# Patient Record
(demographics unavailable — no encounter records)

---

## 2024-09-09 NOTE — ER
Nurse's Notes                                                                                     

 [facility]                                                                 

Name: Marisabel Cortés                                                                               

Age: 30 yrs                                                                                       

Sex: Female                                                                                       

: 1994                                                                                   

MRN: [id number]                                                                                   

Arrival Date: 2024                                                                          

Time: 17:56                                                                                       

Account#: [id number]                                                                            

Bed 13                                                                                            

Private MD:                                                                                       

Diagnosis: Syncope Near                                                                           

                                                                                                  

Presentation:                                                                                     

                                                                                             

18:14 Chief complaint: Patient states: Seen at [facility] today due to patient passing out cm10

      in vehicle. Pt's  states that patient has been more weak. Pt's  reports       

      that she brought patient to this ER due to wanting more tests done. Pt complaining of       

      generalized weakness. Coronavirus screen: Client denies travel out of the [place] in the       

      last 14 days. Ebola Screen: Patient denies travel to an Ebola-affected area in the 21       

      days before illness onset. No symptoms or risks identified at this time. Initial Sepsis     

      Screen: Does the patient meet any 2 criteria? No. Patient's initial sepsis screen is        

      negative. Does the patient have a suspected source of infection? No. Patient's initial      

      sepsis screen is negative. Risk Assessment: Do you want to hurt yourself or someone         

      else? Patient reports no desire to harm self or others. Onset of symptoms was 2024.                                                                                   

18:14 Method Of Arrival: Ambulatory                                                           cm10

18:14 Acuity: HALIE 3                                                                           cm10

                                                                                                  

Triage Assessment:                                                                                

18:17 General: Appears in no apparent distress. comfortable, Behavior is calm, cooperative.   cm10

      Neuro: No deficits noted. Level of Consciousness is awake, alert, obeys commands,           

      Oriented to person, place, time, situation, Appropriate for age. Respiratory: No            

      deficits noted. Airway is patent Respiratory effort is even, unlabored, Respiratory         

      pattern is regular, symmetrical.                                                            

                                                                                                  

OB/GYN:                                                                                           

23:02 Not pregnant                                                                            al5 

                                                                                                  

Historical:                                                                                       

- Allergies:                                                                                      

18:17 PENICILLINS;                                                                            cm10

18:17 tramadol;                                                                               cm10

18:17 Mushrooms;                                                                              cm10

- PMHx:                                                                                           

18:17 Asthma; Rheumatoid Arthritis; SVT;                                                      cm10

                                                                                                  

- Immunization history:: Adult Immunizations up to date.                                          

- Infectious Disease History:: Denies.                                                            

- Social history:: Smoking status: Patient denies any tobacco usage or history of.                

                                                                                                  

                                                                                                  

Screenin:24 [facility] ED Fall Risk Assessment (Adult) History of falling in the last 3 months,       al5 

      including since admission No falls in past 3 months (0 pts) Confusion or Disorientation     

      No (0 pts) Intoxicated or Sedated No (0 pts) Impaired Gait No (0 pts) Mobility Assist       

      Device Used No (0 pt) Altered Elimination No (0 pt) Score/Fall Risk Level 0 - 2 = Low       

      Risk Oriented to surroundings, Maintained a safe environment, Hourly rounding (assess       

      needs \T\ fall precautionary measures) done. Abuse screen: Denies threats or abuse.         

      Denies injuries from another. Nutritional screening: No deficits noted. Tuberculosis        

      screening: No symptoms or risk factors identified.                                          

                                                                                                  

Assessment:                                                                                       

21:26 General: Appears in no apparent distress. Behavior is calm, cooperative. General:       al5 

      Appears tired. Pain: Denies pain. Neuro: Level of Consciousness is awake, alert, obeys      

      commands, lethargic, Oriented to person, place, time, situation. Cardiovascular:            

      Capillary refill < 3 seconds Patient's skin is warm and dry. Respiratory: Airway is         

      patent Respiratory effort is even, unlabored, Respiratory pattern is regular,               

      symmetrical. GI: No signs and/or symptoms were reported involving the gastrointestinal      

      system. : No signs and/or symptoms were reported regarding the genitourinary system.      

      EENT: No signs and/or symptoms were reported regarding the EENT system. Derm: Skin is       

      intact, Skin is pink, warm \T\ dry. normal. Musculoskeletal: No signs and/or symptoms       

      reported regarding the musculoskeletal system.                                              

                                                                                                  

Vital Signs:                                                                                      

18:14  / 92; Pulse 83; Resp 18; Temp 96.9(IR); Pulse Ox 100% on R/A; Weight 83.46 kg;   cm10

      Height 5 ft. 5 in. ; Pain 0/10;                                                             

21:30  / 91; Pulse 59; Resp 16; Pulse Ox 100% on R/A;                                   al5 

21:45  / 86; Pulse 54; Resp 16; Pulse Ox 99% on R/A;                                    al5 

22:00  / 85; Pulse 57; Resp 16; Pulse Ox 99% on R/A;                                    al5 

22:15  / 90; Pulse 59; Resp 17; Pulse Ox 99% on R/A;                                    al5 

22:30  / 98; Pulse 63; Resp 18; Pulse Ox 100% on R/A;                                   al5 

22:47  / 98; Pulse 62; Resp 18; Pulse Ox 100% on R/A;                                   al5 

18:14 Body Mass Index 30.62 (83.46 kg, 165.1 cm)                                              cm10

18:14 Pain Scale: Adult                                                                       cm10

                                                                                                  

ED Course:                                                                                        

18:00 Patient arrived in ED.                                                                  jj6 

18:03 Den Montes De Oca PA is PHCP.                                                                cp  

18:03 Louis Lin MD is Attending Physician.                                                cp  

18:04 PHCP role handed off by Den Montes De Oca PA                                                 sb4 

18:04 Ayana Zee PA-C is PHCP.                                                            sb4 

18:17 Triage completed.                                                                       cm10

18:18 Arm band placed on Patient placed in waiting room.                                      cm10

19:47 CT Head Brain wo Cont In Process Unspecified.                                           EDMS

20:39 Eulalia Bradford, RN is Primary Nurse.                                                 al5 

21:17 EKG done, by ED staff.                                                                  vk  

21:24 Basic Metabolic Panel Sent.                                                             al5 

21:24 CBC with Diff Sent.                                                                     al5 

21:24 Hepatic Function Sent.                                                                  al5 

21:24 Magnesium Sent.                                                                         al5 

21:24 Pregnancy Test, Urine Sent.                                                             al5 

21:24 Protime (+inr) Sent.                                                                    al5 

21:24 Ptt, Activated Sent.                                                                    al5 

21:24 Troponin High Sensitivity Sent.                                                         al5 

21:24 UDS Sent.                                                                               al5 

21:24 Urinalysis w/ reflexes Sent.                                                            al5 

21:25 Patient has correct armband on for positive identification. Bed in low position. Call   al5 

      light in reach. Side rails up X 1. Provided Education on: processes and procedures.         

21:25 No provider procedures requiring assistance completed. Inserted saline lock: 22 gauge   al5 

      in right antecubital area, using aseptic technique.                                         

22:43 Avinash Diego MD is Referral Physician.                                             sb4 

23:03 IV discontinued, intact, bleeding controlled, No redness/swelling at site. Pressure     al5 

      dressing applied.                                                                           

                                                                                                  

Administered Medications:                                                                         

No medications were administered                                                                  

                                                                                                  

                                                                                                  

Medication:                                                                                       

21:25 VIS not applicable for this client.                                                     al5 

                                                                                                  

Outcome:                                                                                          

22:43 Discharge ordered by MD.                                                                sb4 

23:03 Discharged to home ambulatory, with significant other,                                  al5 

23:03 Condition: good                                                                             

23:03 Discharge instructions given to patient, Instructed on discharge instructions, follow       

      up and referral plans. Demonstrated understanding of instructions, follow-up care,          

23:03 Patient left the ED.                                                                    al5 

                                                                                                  

Signatures:                                                                                       

Dispatcher [employer]                           EDMS                                                 

Den Montes De Oca PA                         PA   cp                                                   

Gentry, Quin                           jj6                                                  

Ayana Zee PA-C                     PATIM sb4                                                  

Erica Marina, RN                  RN   cm10                                                 

Chyna Schaeffer Amanda, RN                   RN   al5                                                  

                                                                                                  

Corrections: (The following items were deleted from the chart)                                    

18:17 18:14 Chief complaint: Patient states: Seen at [facility] today due to patient       cm10

      passing out in vehicle. Pt's  states that patient has been more weak. Pt's           

       reports that she brought patient to this ER due to wanting more tests done. cm10    

                                                                                                  

**************************************************************************************************

## 2024-09-09 NOTE — RAD REPORT
EXAM DESCRIPTION:  CT - Head Brain Wo Cont - 9/9/2024 7:47 pm

 

CLINICAL HISTORY:  Syncope

 

COMPARISON:  none

 

TECHNIQUE:  Computed axial tomography of the head was obtained. IV contrast was not requested.

 

All CT scans are performed using dose optimization technique as appropriate and may include automated
 exposure control or mA/KV adjustment according to patient size.

 

FINDINGS:  An intracranial  bleed is not seen

 

The ventricles are normal in caliber

 

No significant hypodense areas within the brain visualized

 

No extra-axial fluid collection is noted.

 

Fluid within the sinuses/ mastoids is not seen

 

IMPRESSION:  No acute intracranial abnormality is seen

 

If patient's symptoms persist  MRI of the brain would be recommended

## 2024-09-09 NOTE — EDPHYS
Physician Documentation                                                                           

 [facility]                                                                 

Name: Marisabel Cortés                                                                               

Age: 30 yrs                                                                                       

Sex: Female                                                                                       

: 1994                                                                                   

MRN: [id number]                                                                                   

Arrival Date: 2024                                                                          

Time: 17:56                                                                                       

Account#: [id number]                                                                            

Bed 13                                                                                            

Private MD:                                                                                       

ED Physician Louis Lin                                                                         

HPI:                                                                                              

                                                                                             

18:26 This 30 yrs old  Female presents to ER via Ambulatory with complaints of        sb4 

      Weakness.                                                                                   

18:26 Patient states that this morning she started feeling strangely-weak, moving slowly,     sb4 

      brain fog. She called her  who came home and called an ambulance.  states     

      that she was going in and out of consciousness and shaking but states that he did not       

      think it was seizure-like activity. States that she is kind of been in a "daze" since       

      this happened. She was evaluated at [facility], had basic blood work done and was        

      discharged.  does not think she is normal and brought her here for second            

      opinion.                                                                                    

                                                                                                  

OB/GYN:                                                                                           

23:02 Not pregnant                                                                            al5 

                                                                                                  

Historical:                                                                                       

- Allergies:                                                                                      

18:17 PENICILLINS;                                                                            cm10

18:17 tramadol;                                                                               cm10

18:17 Mushrooms;                                                                              cm10

- PMHx:                                                                                           

18:17 Asthma; Rheumatoid Arthritis; SVT;                                                      cm10

                                                                                                  

- Immunization history:: Adult Immunizations up to date.                                          

- Infectious Disease History:: Denies.                                                            

- Social history:: Smoking status: Patient denies any tobacco usage or history of.                

                                                                                                  

                                                                                                  

ROS:                                                                                              

18:26 Constitutional: Negative for fever, chills, and weight loss,                            sb4 

18:26 Neuro: Positive for dizziness, loss of consciousness, syncope, tingling, weakness,          

18:26 All other systems are negative,                                                             

                                                                                                  

Exam:                                                                                             

18:26 Head/Face:  Normocephalic, atraumatic. Eyes:  Extra-ocular motions intact.  Periorbital sb4 

      areas with no swelling, redness, or edema. ENT:  Mucous membranes moist.                    

      Cardiovascular:  Regular rate and rhythm with a normal S1 and S2. Respiratory:  Lungs       

      have equal breath sounds bilaterally, clear to auscultation and percussion.  No rales,      

      rhonchi or wheezes noted.  No increased work of breathing, no retractions or nasal          

      flaring. Abdomen/GI:  Soft, non-tender, no distension. Skin:  Warm, dry with normal         

      turgor.  Normal color with no rashes, no lesions, and no evidence of cellulitis. MS/        

      Extremity:  Pulses equal, no cyanosis.  Neurovascular intact.  Full, normal range of        

      motion.                                                                                     

18:26 Constitutional: The patient appears in no acute distress, alert, awake,                     

18:26 Neuro: Orientation: to person, place, time \T\ situation. Mentation: is normal, lucid,      

      able to follow commands, sleepy, Memory: is normal, Motor: moves all fours, Sensation:      

      is normal, Gait: is steady, seizure activity, is not displayed by the patient,              

18:26 Psych: Affect is flat,                                                                      

                                                                                                  

Vital Signs:                                                                                      

18:14  / 92; Pulse 83; Resp 18; Temp 96.9(IR); Pulse Ox 100% on R/A; Weight 83.46 kg;   cm10

      Height 5 ft. 5 in. ; Pain 0/10;                                                             

21:30  / 91; Pulse 59; Resp 16; Pulse Ox 100% on R/A;                                   al5 

21:45  / 86; Pulse 54; Resp 16; Pulse Ox 99% on R/A;                                    al5 

22:00  / 85; Pulse 57; Resp 16; Pulse Ox 99% on R/A;                                    al5 

22:15  / 90; Pulse 59; Resp 17; Pulse Ox 99% on R/A;                                    al5 

22:30  / 98; Pulse 63; Resp 18; Pulse Ox 100% on R/A;                                   al5 

22:47  / 98; Pulse 62; Resp 18; Pulse Ox 100% on R/A;                                   al5 

18:14 Body Mass Index 30.62 (83.46 kg, 165.1 cm)                                              cm10

18:14 Pain Scale: Adult                                                                       cm10

                                                                                                  

MDM:                                                                                              

18:05 Patient medically screened.                                                             sb4 

22:42 Data reviewed: vital signs, nurses notes, lab test result(s), EKG, radiologic studies,  sb4 

      and as a result, I will discharge patient. Counseling: I had a detailed discussion with     

      the patient and/or guardian regarding the historical points, exam findings, and any         

      diagnostic results supporting the discharge/admit diagnosis, lab results, radiology         

      results, the need for outpatient follow up, a neurologist, to return to the emergency       

      department if symptoms worsen or persist or if there are any questions or concerns that     

      arise at home.                                                                              

                                                                                                  

                                                                                             

18:25 Order name: Basic Metabolic Panel; Complete Time: 22:04                                 sb4 

                                                                                             

18:25 Order name: CBC with Diff; Complete Time: 21:38                                         sb4 

                                                                                             

18:25 Order name: Hepatic Function; Complete Time: 22:04                                      sb4 

                                                                                             

18:25 Order name: Magnesium; Complete Time: 22:04                                             sb4 

                                                                                             

18:25 Order name: Pregnancy Test, Urine; Complete Time: 21:38                                 sb4 

                                                                                             

18:25 Order name: Protime (+inr); Complete Time: 21:43                                        sb4 

                                                                                             

18:25 Order name: Ptt, Activated; Complete Time: 21:43                                        sb4 

                                                                                             

18:25 Order name: Troponin High Sensitivity; Complete Time: 22:04                             sb4 

                                                                                             

18:25 Order name: UDS; Complete Time: :43                                                   sb4 

                                                                                             

18:25 Order name: Urinalysis w/ reflexes; Complete Time: 21:38                                sb4 

                                                                                             

18:25 Order name: CT Head Brain wo Cont; Complete Time: 20:08                                 sb4 

                                                                                             

18:25 Order name: EKG; Complete Time: 18:26                                                   sb4 

                                                                                             

18:25 Order name: Cardiac monitoring; Complete Time: 21:24                                    sb4 

                                                                                             

18:25 Order name: EKG - Nurse/Tech; Complete Time: 21:08                                      sb4 

                                                                                             

18:25 Order name: IV Saline Lock; Complete Time: 21:24                                        sb4 

                                                                                             

18:25 Order name: Labs collected and sent; Complete Time: 21:24                               sb4 

                                                                                             

18:25 Order name: O2 Per Protocol; Complete Time: 21:23                                       sb4 

                                                                                             

18:25 Order name: O2 Sat Monitoring; Complete Time: 21:23                                     sb4 

                                                                                                  

EC:31 Rate is 68 beats/min. Rhythm is regular, Normal Sinus Rhythm. TN interval is normal at  sb4 

      200 msec. QRS interval is normal at 98 msec. QT interval is normal at 424 msec. No Q        

      waves. T waves are Normal. No ST changes noted. Clinical impression: Normal ECG and No      

      evidence of ischemia. Interpreted by me. Reviewed by me.                                    

                                                                                                  

Administered Medications:                                                                         

No medications were administered                                                                  

                                                                                                  

                                                                                                  

Disposition Summary:                                                                              

24 22:43                                                                                    

Discharge Ordered                                                                                 

 Notes:       Location: Home                                                                        
  sb4

      Problem: new                                                                            sb4 

      Symptoms: have improved                                                                 sb4 

      Condition: Stable                                                                       sb4 

      Diagnosis                                                                                   

        - Syncope Near                                                                        sb4 

      Followup:                                                                               sb4 

        - With: Avinash Diego MD                                                               

        - When: As needed                                                                          

        - Reason: Further diagnostic work-up, Recheck today's complaints, Re-evaluation by         

      your physician                                                                              

      Discharge Instructions:                                                                     

        - Discharge Summary Sheet                                                             sb4 

        - Near-Syncope                                                                        sb4 

        - Non-Epileptic Seizures, Adult                                                       sb4 

      Forms:                                                                                      

        - Patient Portal Instructions                                                         sb4 

        - Leadership Thank You Letter                                                         sb4 

Signatures:                                                                                       

Dispatcher [employer]                           Ayana Butler PA-C PA-C sb4                                                  

Erica Marina RN                  RN   cm10                                                 

                                                                                                  

**************************************************************************************************

## 2024-09-09 NOTE — XMS REPORT
Continuity of Care Document



                          Created on: 2024





DHEERAJ NICHOLAS

External Reference #: [id number]

: 1994

Sex: Female



Demographics





                                        Address             [address] [place] [address]  [id number]

 

                                        Home Phone          [phone]

 

                                        Work Phone          [phone]

 

                                        Mobile Phone        [phone]

 

                                        Email Address       [email or web address]

[email or web address]

 

                                        Preferred Language  Unknown

 

                                        Marital Status      Unknown

 

                                        Amish Affiliation Unknown

 

                                        Race                Unknown

 

                                        Additional Race(s)  White

 

                                        Ethnic Group        Unknown





Author





                                        Name                Unknown

 

                                        Address             [address] [facility]

[place] [id number]

 

                                        [facility]

[facility]

 

                                        Address             [address] [facility] [address]

[address]

[place]  [address]

 

                                        Phone               [phone]





Care Team Providers





                                Care Team Member Name Role            Phone

 

                                Tiffanie Lehman   Primary Care Physician [phone]







Allergies, Adverse Reactions, Alerts





                                                    Allergy 

Name                                    Allergy 

Type            Status          Severity        Reaction(s)     Onset 

Date                                    Inactive 

Date                                    Treating 

Clinician                 Comments                  Source

 

                                                    Penicill

ins                                     Propensi

ty to 

adverse 

reaction 

to drug         Active                                          

6-19 

00:00:

00                                                              Lamberto Tovar

 

                                                    Tramadol 

- CLASS                                 Propensi

ty to 

adverse 

reaction 

to drug         Active                                          

8-11 

00:00:

00                                                              Lamberto Tovar







Medications





                                                    Ordered 

Medication 

Name                                    Filled 

Medication 

Name                                    Start 

Date                                    Stop 

Date                                    Current 

Medication?                             Ordering 

Clinician       Indication      Dosage          Frequency       Signature 

(SIG)               Comments            Components          Source

 

                                                    TAKE 1 

TABLET BY 

MOUTH EVERY 

DAY                                                 

3-04 

00:00:

00            Yes                  10                                 Lamberto Tovar

 

                                                    TAKE 1 

TABLET BY 

MOUTH EVERY 

6 HOURS 

WITH FOOD 

OR MILK AS 

NEEDED FOR 

PAIN                                                

2-27 

00:00:

00            Yes                                                     Lamberto Tovar

 

                                                    FLUCONAZOLE 

200 MG TABS                                         

2-02 

00:00:

00            Yes                                                     Lamberto Tovar

 

                                                    INSERT 1 

APPLICATOR 

INTO VAGINA 

AT BEDTIME 

FOR 7 DAYS                                          

2-01 

00:00:

00            Yes                                                     Lamberto Tovar

 

                                                    TAKE 1 

TABLET BY 

MOUTH EVERY 

6 HOURS AS 

NEEDED FOR 

ITCHING                                             

1-31 

00:00:

00            Yes                                                     Lamberto Tovar

 

                                                    INSTILL 1 

DROP INTO 

LEFT EYE 4 

TIMES 

DAILY.                                              

1-19 

00:00:

00                                      -14 

00:00

:00     No                      1                                       Lamberto Tovar

 

                                                    BUSPIRONE 

HYDROCHLORI

DE 10 MG 

TABS                                                

1-09 

00:00:

00            Yes                                                     Lamberto Tovar

 

                                                    TAKE 2 

TABLETS BY 

MOUTH EVERY 

DAY AS 

NEEDED FOR 

CONSTIPATIO

N                                                   2023

2-15 

00:00:

00            Yes                                                     Lamberto Tovar

 

                                                    TAKE 1 

TABLET BY 

MOUTH IN 

THE MORNING 

AND IN THE 

EVENING                                             202315 

00:00:

00            Yes                                                     Lamberto Tovar

 

                                                    METOPROLOL 

TARTRATE 25 

MG TABS                                             2023

2-13 

00:00:

00            Yes                                                     Lamberto Tovar

 

                                                    BUSPIRONE 

HYDROCHLORI

DE 7.5 MG 

TABS                                                2023

1-17 

00:00:

00            Yes                                                     Lamberto Tovar

 

                                                    TAKE 1 

CAPSULE 

TWICE 

DAILY.                                              2023-15 

00:00:

00                                       

00:00

:00     No                      75                                      Lamberto Tovar

 

                                                    TAKE 1 

CAPSULE BY 

MOUTH IN 

THE MORNING 

AND 1 

CAPSULE IN 

THE EVENING 

FOR 5 DAYS                                          2023 

00:00:

00            Yes                                                     Lamberto Tovar

 

                                                    VENTOLIN 

 (90 

Base) 

MCG/ACT 

AERS                                                2023 

00:00:

00            Yes                                                     Lamberto Tovar

 

                                                    HYDROXYCHLO

ROQUINE 

SULFATE 200 

MG TABS                                             - 

00:00:

00            Yes                                                     Lamberto Tovar

 

                                                    INSERT 1 

APPLICATORF

UL 

INTRAVAGINA

LLY AT 

BEDTIME 

NIGHTLY.                                            -15 

00:00:

00                                       

00:00

:00     No                      2                                       Lamberto Tovar

 

                                                    TAKE 1 

TABLET NOW 

AND 1 IN 3 

DAYS                                                -15 

00:00:

00                                       

00:00

:00     No                      150                                     aLmberto Tovar

 

                                                    TAKE 1 

TABLET BY 

MOUTH EVERY 

DAY                                                 - 

00:00:

00            Yes                                                     Lamberto Tovar

 

                                                    PREDNISONE 

10 MG TABS                                          

5-04 

00:00:

00            Yes                                                     Lamberto Tovar

 

                                                    ENBREL 

SURECLICK 

50 MG/ML 

SOAJ                                                

2-27 

00:00:

00            Yes                                                     Lamberto Tovar

 

                                                    TAKE 1 

TABLET 

TWICE DAILY 

WITH FOOD.                                          

2-20 

00:00:

00                                       

00:00

:00     No                      [id number]                                  Lamberto Tovar

 

                                                    TAKE 1 

TABLET 

DAILY.                                              

2-16 

00:00:

00                                       

00:00

:00     No                      10                                      Lamberto Tovar

 

                                                    INJECT 1.2 

MG 

SUBCUTANEOU

SLY EVERY 

DAY                                                 

2-10 

00:00:

00                                       

00:00

:00     No                      183                                     Lamberto Tovar

 

                                                    INJECT 

0.6MG 

SUBCUTANEOU

SLY DAILY                                           

1-13 

00:00:

00                                       

00:00

:00     No                      183                                     Lamberto Tovar

 

                                                    TAKE [id number] 

IU WEEKLY 

WITH A MEAL 

FOR 12 

WEEKS                                               2022

2-08 

00:00:

00                                       

00:00

:00             No                                              [id number]

[id number]                                                                Lamberto Tovar

 

                                                    APPLY 

SPARINGLY 

TO THE 

AFFECTED 

AREA(S) 

TWICE 

DAILY.                                              2022

2 

00:00:

00                                       

00:00

:00     No                      3                                       Lamberto Tovar

 

                                                    VENTOLIN 

 (90 

Base) 

MCG/ACT 

AERS                                                18 

00:00:

00            Yes                                                     Lamberto Tovar

 

                                                    TAKE 1 

CAPSULE BY 

MOUTH TWICE 

DAILY                                                

00:00:

00            Yes                                                     Lamberto Tovar

 

                                                    TAKE 6 

TABLETS BY 

MOUTH EVERY 

WEEK                                                 

00:00:

00            Yes                                                     Lamberto Tovar

 

                                                    PREDNISONE 

2.5 MG TABS                                          

00:00:

00            Yes                                                     Lamberto Tovar

 

                                                    IBUPROFEN 

800 MG TABS                                         

8- 

00:00:

00            Yes                                                     Lamberto Tovar

 

                                                    NORETHINDRO

NE 0.35 MG 

TABS                                                 

00:00:

00            Yes                                                     Lamberto Tovar

 

                                                    TAKE 1 

TABLET BY 

MOUTH DAILY                                         - 

00:00:

00            Yes                                                     Lamberto Tovar

 

                                                    VENTOLIN 

 (90 

Base) 

MCG/ACT 

AERS                                                2022-0

3- 

00:00:

00            Yes                                                     Lamberto Tovar

 

                                                    METOPROLOL 

SUCCINATE 

ER 25 MG 

TB24                                                

3-22 

00:00:

00            Yes                                                     Lamberto Tovar

 

                                                    OSELTAMIVIR 

PHOSPHATE 

75 MG                                               2022-0

3- 

00:00:

00            Yes                                                     Lamberto Tovar







Immunizations





                                                    Ordered Immunization 

Name                                    Filled Immunization 

Name            Date            Status          Comments        Source

 

                                                    (Old, dont use) 

Moderna Covid-19 

Vaccine (Aged 12 

years and older) 

()                               (Old, dont use) 

Moderna Covid-19 

Vaccine (Aged 12 

years and older) 

()                               2022 

00:00:00            Completed                               Lamberto Tovar

 

                                                    (Old, dont use) 

Moderna Covid-19 

Vaccine (Aged 12 

years and older) 

()                               (Old, dont use) 

Moderna Covid-19 

Vaccine (Aged 12 

years and older) 

()                               2022 

00:00:00            Completed                               Lamberto Tovar

 

                                                    Td(adult) 

unspecified fo                          Td(adult) 

unspecified fo                          2001 

00:00:00            Completed                               Lamberto Tovar

 

                                varicella       varicella       2000 

00:00:00            Completed                               Lamberto Tovar

 

                                MMR             MMR             1998 

00:00:00            Completed                               Lamberto Tovar

 

                                                    polio, unspecified 

formu                                   polio, unspecified 

formu                                   1998 

00:00:00            Completed                               Lamberto Tovar

 

                                                    DTaP, unspecified 

formul                                  DTaP, unspecified 

formul                                  1998 

00:00:00            Completed                               Lamberto Tovar

 

                                                    Hib, unspecified 

formula                                 Hib, unspecified 

formula                                 1995 

00:00:00            Completed                               Lamberto Tovar

 

                                                    DTaP, unspecified 

formul                                  DTaP, unspecified 

formul                                  1995 

00:00:00            Ubaldo Tovar

 

                                MMR             MMR             1995 

00:00:00            Completed                               Lamberto Tovar

 

                                                    Hep B, unspecified 

formu                                   Hep B, unspecified 

formu                                   1994 

00:00:00            Completed                               Lamberto Tovar

 

                                                    Hib, unspecified 

formula                                 Hib, unspecified 

formula                                 1994 

00:00:00            Completed                               Lamberto Tovar

 

                                                    polio, unspecified 

formu                                   polio, unspecified 

formu                                   1994 

00:00:00            Ubaldo Tovar

 

                                                    DTaP, unspecified 

formul                                  DTaP, unspecified 

formul                                  1994 

00:00:00            Ubaldo Tovar

 

                                                    Hib, unspecified 

formula                                 Hib, unspecified 

formula                                 1994 

00:00:00            Completed                               Lamberto Tovar

 

                                                    polio, unspecified 

formu                                   polio, unspecified 

formu                                   1994 

00:00:00            Completed                               Lamberto Tovar

 

                                                    DTaP, unspecified 

formul                                  DTaP, unspecified 

formul                                  1994 

00:00:00            Completed                               Lamberto Tovar

 

                                                    Hep B, unspecified 

formu                                   Hep B, unspecified 

formu                                   1994 

00:00:00            Completed                               Lamberto Tovar

 

                                                    Hib, unspecified 

formula                                 Hib, unspecified 

formula                                 1994 

00:00:00            Completed                               Lamberto Tovar

 

                                                    polio, unspecified 

formu                                   polio, unspecified 

formu                                   1994 

00:00:00            Completed                               Lamberto Tovar

 

                                                    DTaP, unspecified 

formul                                  DTaP, unspecified 

formul                                  1994 

00:00:00            Completed                               Lamberto Tovar

 

                                                    Hep B, unspecified 

formu                                   Hep B, unspecified 

formu                                   1994 

00:00:00            Completed                               Lamberto Tovar







Vital Signs





                      Vital Name Observation Time Observation Value Comments   S

chapito

 

                      BP Systolic 2023 09:12:00 103 mm[Hg]            Step

hen F Guy

 

                      BP Diastolic 2023 09:12:00 68 mm[Hg]             Pepito

phen F Guy

 

                      Weight Measured 2023 09:12:00 196.00 pounds         

   Lamberto F Guy

 

                      Height Measured 2023 09:12:00 63.78 inches          

  Lamberto F Guy

 

                      Body Temperature 2023 09:12:00 98.30 degrees        

    Lamberto F Guy

 

                      Heart Rate 2023 09:12:00 88.00 /min            Hilary

en F Guy

 

                      Respiratory Rate 2023 09:12:00                      

 Lamberto F Guy

 

                      BP Systolic 2023 08:47:00 103 mm[Hg]            Step

hen F Guy

 

                      BP Diastolic 2023 08:47:00 68 mm[Hg]             Pepito

phen F Guy

 

                      Weight Measured 2023 08:47:00 196.00 pounds         

   Lamberto F Guy

 

                      Height Measured 2023 08:47:00 63.78 inches          

  Labmerto F Guy

 

                      Body Temperature 2023 08:47:00 98.30 degrees        

    Lamberto F Guy

 

                      Heart Rate 2023 08:47:00 88.00 /min            Hilary

en F Guy

 

                      Respiratory Rate 2023 08:47:00                      

 Lamberto F Guy

 

                      BP Systolic 2023-10-12 16:39:00 111 mm[Hg]            Step

hen F Guy

 

                      BP Diastolic 2023-10-12 16:39:00 71 mm[Hg]             Pepito

phen F Guy

 

                      Weight Measured 2023-10-12 16:39:00 190.00 pounds         

   Lamberto F Guy

 

                      Height Measured 2023-10-12 16:39:00 63.78 inches          

  Lamberto F Guy

 

                      Body Temperature 2023-10-12 16:39:00 98.20 degrees        

    Lamberto F Guy

 

                      Heart Rate 2023-10-12 16:39:00 91.00 /min            Hilary

en F Guy

 

                      Respiratory Rate 2023-10-12 16:39:00                      

 Lamberto F Guy

 

                      BP Systolic 2023 16:22:00 125 mm[Hg]            Step

hen F Guy

 

                      BP Diastolic 2023 16:22:00 75 mm[Hg]             Pepito

phen F Guy

 

                      Weight Measured 2023 16:22:00 188.00 pounds         

   Lamberto F Guy

 

                      Height Measured 2023 16:22:00 63.78 inches          

  Lamberto F Guy

 

                      Body Temperature 2023 16:22:00 97.60 degrees        

    Lamberto F Guy

 

                      Heart Rate 2023 16:22:00 65.00 /min            Hilary

en F Guy

 

                      Respiratory Rate 2023 16:22:00                      

 Lamberto F Guy

 

                      BP Systolic 2023 17:44:00 124 mm[Hg]            Step

hen F Guy

 

                      BP Diastolic 2023 17:44:00 79 mm[Hg]             Pepito

phen F Guy

 

                      Weight Measured 2023 17:44:00 185.20 pounds         

   Lamberto F Guy

 

                      Height Measured 2023 17:44:00 63.78 inches          

  Lamberto F Guy

 

                      Body Temperature 2023 17:44:00 98.30 degrees        

    Lamberto F Guy

 

                      Heart Rate 2023 17:44:00 64.00 /min            Hilary

en F Guy

 

                      Respiratory Rate 2023 17:44:00                      

 Lamberto F Guy

 

                      BP Systolic 2023-06-15 17:59:00 118 mm[Hg]            Step

hen F Guy

 

                      BP Diastolic 2023-06-15 17:59:00 76 mm[Hg]             Pepito

phen F Guy

 

                      Weight Measured 2023-06-15 17:59:00 186.60 pounds         

   Lamberto F Guy

 

                      Height Measured 2023-06-15 17:59:00 63.78 inches          

  Lamberto F Guy

 

                      Body Temperature 2023-06-15 17:59:00 98.40 degrees        

    Lamberto F Guy

 

                      Heart Rate 2023-06-15 17:59:00 92.00 /min            Hilary

en F Guy

 

                      Respiratory Rate 2023-06-15 17:59:00                      

 Lamberto F Guy

 

                      BP Systolic 2023-02-10 17:21:00 131 mm[Hg]            Step

hen F Guy

 

                      BP Diastolic 2023-02-10 17:21:00 81 mm[Hg]             Pepito

phen F Guy

 

                      Weight Measured 2023-02-10 17:21:00 180.80 pounds         

   Lamberto F Guy

 

                      Height Measured 2023-02-10 17:21:00 63.78 inches          

  Lamberto F Guy

 

                      Body Temperature 2023-02-10 17:21:00 97.70 degrees        

    Lamberto F Guy

 

                      Heart Rate 2023-02-10 17:21:00 80.00 /min            Hilary

en F Guy

 

                      Respiratory Rate 2023-02-10 17:21:00                      

 Lamberto F Guy

 

                      BP Systolic 2023 16:07:00 132 mm[Hg]            Step

hen F Guy

 

                      BP Diastolic 2023 16:07:00 84 mm[Hg]             Pepito

phen F Guy

 

                      Weight Measured 2023 16:07:00 181.20 pounds         

   Lamberto F Guy

 

                      Height Measured 2023 16:07:00 63.78 inches          

  Lamberto F Guy

 

                      Body Temperature 2023 16:07:00 97.50 degrees        

    Lamberto F Guy

 

                      Heart Rate 2023 16:07:00 88.00 /min            Hilary

en F Guy

 

                      Respiratory Rate 2023 16:07:00                      

 Lamberto F Guy

 

                      BP Systolic 2022 17:24:00 117 mm[Hg]            Step

hen F Guy

 

                      BP Diastolic 2022 17:24:00 76 mm[Hg]             Pepito

phen F Guy

 

                      Weight Measured 2022 17:24:00 177.40 pounds         

   Lamberto F Guy

 

                      Height Measured 2022 17:24:00 63.78 inches          

  Lamberto F Guy

 

                      Body Temperature 2022 17:24:00                      

 Lamberto F Guy

 

                      Heart Rate 2022 17:24:00 69.00 /min            Hilary

en F Guy

 

                      Respiratory Rate 2022 17:24:00                      

 Lamberto F Guy

 

                      BP Systolic 2022 15:32:00 115 mm[Hg]            Step

hen F Guy

 

                      BP Diastolic 2022 15:32:00 75 mm[Hg]             Pepito

phen F Guy

 

                      Weight Measured 2022 15:32:00 171.80 pounds         

   Lamberto F Guy

 

                      Height Measured 2022 15:32:00 63.78 inches          

  Lamberto Tovar

 

                      Body Temperature 2022 15:32:00                      

 Lamberto Tovar

 

                      Heart Rate 2022 15:32:00 69.00 /min            Hilary Tovar

 

                      Respiratory Rate 2022 15:32:00                      

 Lamberto Tovar







Encounters





                                                    Start 

Date/Time                               End 

Date/Time                               Encounter 

Type                                    Admission 

Type                                    Attending 

[facility]                                Care 

Department                              Encounter 

ID                                      Source

 

                                                    2024 

16:44:21                                2024 

16:44:21   Outpatient                       SFA        SFA        [phone]

[id number]                                   Lamberto Tovar

 

                                                    2024 

00:00:00                                2024 

00:00:00                                Outpatient 

Visit                                  SFA          [id number]   [id number]

[id number]

[id number]

[id number]                                  Lamberto Tovar

 

                                                    2023 

09:13:06                                2023 

09:13:06   Outpatient                       SFA        SFA        [phone]

[id number]                                   Lamberto Tovar

 

                                                    2023-10-12 

16:39:19                                2023-10-12 

16:39:19   Outpatient                       SFA        SFA        [id number]

[id number]                                   Lamberto Tovar

 

                                                    2023 

08:24:30                                2023 

08:24:30   Outpatient                       SFA        SFA        [id number]

[id number]                                   Lamberto Tovar

 

                                                    2023 

16:22:02                                2023 

16:22:02   Outpatient                       SFA        SFA        [id number]

[id number]                                   Lamberto Tovar

 

                                                    2023 

08:53:54                                2023 

08:53:54   Outpatient                       SFA        SFA        [phone]

[id number]                                   Lamberto Tovar

 

                                                    2023 

17:26:30                                2023 

17:26:30   Outpatient                       SFA        SFA        [id number]

[id number]                                   Lamberto Tovar

 

                                                    2023-02-10 

17:19:56                                2023-02-10 

17:19:56   Outpatient                       SFA        SFA        [id number]

[id number]                                   Lamberto Tovar

 

                                                    2023 

16:04:46                                2023 

16:04:46   Outpatient                       SFA        SFA        [id number]

[id number]                                   Lamberto Tovar

 

                                                    2022 

17:23:58                                2022 

17:23:58   Outpatient                       SFA        SFA        [phone]                                   Lamberto Tovar







Results





                    Test Description Test Time Test Comments Results   Result Co

mments Source







                                        

 

                                        

 

                                        

 

                                        

 

                                        

 

                                        

 

                                        

 

                                        

 

                                        

 

                                        

 

                                        

 

                                        

 

                                        

 

                                        

 

                                        

 

                                        

 

                                        

 

                                        





MATERNAL AFP FOR NTD ONLY2023-10-18 00:00:00* 



                      Test Item  Value      Reference Range Interpretation Comme

nts

 

                                                    INTERPRETATION (test code = 

340188)         SCREEN NEGATIVE                                 

 

                                                    Neural tube defect risk (es

t 

code = 52225)   1:5308                                          

 

                                                    Neural tube defect 

interpretation (test code = 

41016)          (NOTE)                                          

 

                                                    DATE OF BIRTH (test code = 

2660)           1994                                      

 

                                                    MATERNAL WEIGHT (test code =

 

2657)           190 LBS                                         

 

                                                    INITIAL/REPEAT (test code = 

273477)         INITIAL                                         

 

                                                    FAMILY HISTORY OF NTD (test 

code = 106634)  NO                                              

 

                                                    INSULIN DEP. DIABETIC (test 

code = 2659)    NO                                              

 

                      RACE (test code = 2658)                          

 

                      SMOKER? (test code = 281675) NO                           

    

 

                                                    NUMBER OF GESTATIONS (test 

code = 29540)   1                                               

 

                                                    GESTATIONAL AGE (test code =

 

2656)           19.4 WEEKS                                      

 

                                                    DETERMINED BY: (test code = 

2654)           LMP                                             

 

                                                    DATE OF LMP (test code = 

2652)           2023                                      

 

                                                    ADJUST AFP M.O.M. (test code

 

= 2661)         0.79 M.O.M.                                     

 

                                                    PRENATAL AFP (test code = 

95290)          37.2 NG/ML                                      





Lamberto TovarHEMOGLOBIN [id number] 00:00:00* 



                      Test Item  Value      Reference Range Interpretation Comme

nts

 

                      HEMOGLOBIN A1 (test code = 2575) 97.7 %                   

        

 

                      HEMOGLOBIN A2 (test code = 2576) 2.3 %                    

        

 

                                                    HEMOGLOBIN F (FETAL) (test c

ode 

= 2722)         0.0 %                                           

 

                      HEMOGLOBIN S (test code = 2724) NONE %                    

       

 

                      HEMOGLOBIN C (test code = 2726) NONE %                    

       

 

                                                    OTHER HEMOGLOBIN VARIANT (te

st 

code = 92456)   NONE DETEC %                                    

 

                                                    PATHOLOGIST'S INTERPRETATION

 

(test code = 2577) (NOTE)                                          





Lamberto TovarDRUG ABUSE SCREEN 10 REFLEX [id number] 00:00:00* 



                      Test Item  Value      Reference Range Interpretation Comme

nts

 

                      AMPHETAMINES (test code = 3201) NEGATIVE                  

       

 

                      BARBITURATES (test code = 3202) NEGATIVE                  

       

 

                      BENZODIAZEPINES (test code = 3203) NEGATIVE               

          

 

                      CANNABINOIDS (test code = 3204) NEGATIVE                  

       

 

                                                    COCAINE METABOLITE (test cod

e = 

3205)           NEGATIVE                                        

 

                      OPIATES (test code = 3209) NEGATIVE                       

  

 

                      OXYCODONE (test code = 61286) NEGATIVE                    

     

 

                      PHENCYCLIDINE (test code = 3210) NEGATIVE                 

        

 

                      METHADONE (test code = 3207) NEGATIVE                     

    

 

                      BUPRENORPHINE (test code = 63547) NEGATIVE                

         

 

                      SOURCE (test code = 246141) URINE                         

   





Lamberto TovarTSH, THIRD [id number] 00:00:00* 



                      Test Item  Value      Reference Range Interpretation Comme

nts

 

                                                    TSH, THIRD GENERATION (test 

code 

= 2821)         3.590 UIU/ML                                    





Lamberto TovarGLUCOSE, 1 HR, GESTATIONAL SCREEN, 50 GM [id number] 00:00:00
  * 



                      Test Item  Value      Reference Range Interpretation Comme

nts

 

                                                    GLUCOSE 1 HR POST 50 GM (es

t code 

= )         142 MG/DL                                       





Lamberto TovarHERPES SIMPLEX 1 [id number] 00:00:00* 



                      Test Item  Value      Reference Range Interpretation Comme

nts

 

                                                    HERPES SIMPLEX 1 AB, IgG (te

st 

code = 72940)   0.252 INDEX                                     





Lamberto TovarHERPES SIMPLEX 2 [id number] 00:00:00* 



                      Test Item  Value      Reference Range Interpretation Comme

nts

 

                                                    HERPES SIMPLEX 2 AB, IgG (te

st 

code = 82520)   0.081 INDEX                                     





Lamberto TovarOBSTETRIC PANEL + [id number] 00:00:00* 



                      Test Item  Value      Reference Range Interpretation Comme

nts

 

                      WBC (test code = 1001) 7.2 K/UL                         

 

                      RBC (test code = 1002) 4.80 M/UL                        

 

                                                    HEMOGLOBIN (test code = 

1003)           11.0 G/DL                                       

 

                                                    HEMATOCRIT (test code = 

1004)           35.7 %                                          

 

                      MCV (test code = 1005) 74.4 fL                          

 

                      MCH (test code = 1006) 22.9 PG                          

 

                      MCHC (test code = 1007) 30.8 G/DL                        

 

                      RDW (test code = 1038) 18.2 %                           

 

                                                    NEUTROPHILS (test code = 

1008)           70.7 %                                          

 

                                                    LYMPHOCYTES (test code = 

1010)           21.0 %                                          

 

                      MONOCYTES (test code = 1011) 4.4 %                        

    

 

                                                    EOSINOPHILS (test code = 

1012)           2.9 %                                           

 

                      BASOPHILS (test code = 1013) 0.3 %                        

    

 

                                                    IMMATURE GRANULOCYTES (test 

code = 1036)    0.7 %                                           

 

                                                    NUCLEATED RBCS (test code = 

1065)           0.0 /100WBC'S                                   

 

                                                    PLATELET COUNT (test code = 

1015)           341 K/UL                                        

 

                                                    ABSOLUTE NEUTROPHILS (test 

code = 1066)    5.11 K/UL                                       

 

                                                    ABSOLUTE LYMPHOCYTES (test 

code = 1067)    1.52 K/UL                                       

 

                                                    ABSOLUTE MONOCYTES (test 

code = 1068)    0.32 K/UL                                       

 

                                                    ABSOLUTE EOSINOPHILS (test 

code = 1040)    0.21 K/UL                                       

 

                                                    ABSOLUTE BASOPHILS (test 

code = 1069)    0.02 K/UL                                       

 

                                                    ABS IMMATURE GRANULOCYTES 

(test code = 1020) 0.05 K/UL                                       

 

                                                    ABS NUCLEATED RBCS (test 

code = 77097)   0.00 K/UL                                       

 

                                                    BLOOD TYPE AND RH (test code

 

= 3901)         B POSITIVE                                      

 

                                                    ANTIBODY SCREEN (test code =

 

3902)           NEGATIVE                                        

 

                                                    RUBELLA ANTIBODY SCREEN 

(test code = 4600) 21 IU/ML                                        

 

                                                    RUBELLA IgG INTERP (test 

code = 41050)   REACTIVE                                        

 

                                                    HEPATITIS B SURF AG (test 

code = 2739)    NON-REACTIVE                                    

 

                      RPR (test code = 80419) NON-REACTIVE                      

 

 

                      RPR TITER (test code = 3500) NOT INDIC. TITER             

          

 

                                                    HIV 1/2 4TH GEN, RFLX CONF 

(test code = 3514) NON-REACTIVE                                    





Lamberto TovarHEPATITIS C REFLEX [id number] 00:00:00* 



                      Test Item  Value      Reference Range Interpretation Comme

nts

 

                                                    HEPATITIS C ANTIBODY (test c

ode 

= 4675)         NON-REACTIVE                                    





Lamberto TovarVARICELLA ZOSTER [id number] 00:00:00* 



                      Test Item  Value      Reference Range Interpretation Comme

nts

 

                                                    VARICELLA ZOSTER IgG (test c

ode = 

19342)          914 INDEX                                       





Lamberto TovarHCG, [id number] 00:00:00* 



                      Test Item  Value      Reference Range Interpretation Comme

nts

 

                                                    HCG, QUANTITATIVE (test code

 = 

2506)           552 MIU/ML                                      





Lamberto TovarCOMPREHENSIVE METABOLIC [id number] 00:00:00* 



                      Test Item  Value      Reference Range Interpretation Comme

nts

 

                      GLUCOSE (test code = 2217) 120 MG/DL                      

  

 

                      BUN (test code = 2208) 14 MG/DL                         

 

                      CREATININE (test code = 2214) 0.64 MG/DL                  

     

 

                                                    eGFR ( CKD-EPI) (test 

code = 17585)   123 ML/MIN/1.73                                 

 

                                                    CALC BUN/CREAT (test code = 

2235)           22 RATIO                                        

 

                      SODIUM (test code = 2231) 135 MEQ/L                       

 

 

                      POTASSIUM (test code = 2228) 4.1 MEQ/L                    

    

 

                      CHLORIDE (test code = 2215) 100 MEQ/L                     

   

 

                                                    CARBON DIOXIDE (test code = 

2206)           25 MEQ/L                                        

 

                      CALCIUM (test code = 2209) 9.7 MG/DL                      

  

 

                                                    PROTEIN, TOTAL (test code = 

2229)           8.0 G/DL                                        

 

                      ALBUMIN (test code = 2201) 4.4 G/DL                       

  

 

                                                    CALC GLOBULIN (test code = 

2240)           3.6 G/DL                                        

 

                                                    CALC A/G RATIO (test code = 

2234)           1.2 RATIO                                       

 

                                                    BILIRUBIN, TOTAL (test code 

= 

2207)           0.2 MG/DL                                       

 

                                                    ALKALINE PHOSPHATASE (test 

code = 2204)    66 U/L                                          

 

                      AST (test code = 2218) 15 U/L                           

 

                      ALT (test code = 2219) 14 U/L                           





Lamberto TovarHEMOGLOBIN [id number] 00:00:00* 



                      Test Item  Value      Reference Range Interpretation Comme

wilian

 

                      HEMOGLOBIN A1c (test code = 17982) 6.0 %                  

          





Lamberto TovarVITAMIN D, 25 [id number] 00:00:00* 



                      Test Item  Value      Reference Range Interpretation Comme

wilian

 

                      VITAMIN D, 25 OH (test code = 4958) 22 NG/ML              

           





Lamberto TovarHCG, [id number] 00:00:00* 



                      Test Item  Value      Reference Range Interpretation Comme

wilian

 

                                                    HCG, QUANTITATIVE (test code

 = 

2506)           20 MIU/ML                                       





Lamberto TovarSEDIMENTATION [id number] 00:00:00* 



                      Test Item  Value      Reference Range Interpretation Comme

nts

 

                                                    SEDIMENTATION RATE (test cod

e = 

1017)           31 MM/HOUR                                      





Lamberto TovarC-REACTIVE [id number] 00:00:00* 



                      Test Item  Value      Reference Range Interpretation Comme

[facility]

 

                                                    C-REACTIVE PROTEIN (test cod

e = 

3513)           0.8 MG/DL                                       





Lamberto TovarCBC W/AUTO [id number] 00:00:00* 



                      Test Item  Value      Reference Range Interpretation Comme

wilian

 

                      WBC (test code = 1001) 9.0 K/UL                         

 

                      RBC (test code = 1002) 4.69 M/UL                        

 

                      HEMOGLOBIN (test code = 1003) 10.7 G/DL                   

     

 

                      HEMATOCRIT (test code = 1004) 34.6 %                      

     

 

                      MCV (test code = 1005) 73.8 fL                          

 

                      MCH (test code = 1006) 22.8 PG                          

 

                      MCHC (test code = 1007) 30.9 G/DL                        

 

                      RDW (test code = 1038) 17.0 %                           

 

                      NEUTROPHILS (test code = 1008) 67.2 %                     

      

 

                      LYMPHOCYTES (test code = 1010) 26.2 %                     

      

 

                      MONOCYTES (test code = 1011) 4.8 %                        

    

 

                      EOSINOPHILS (test code = 1012) 1.3 %                      

      

 

                      BASOPHILS (test code = 1013) 0.4 %                        

    

 

                                                    IMMATURE GRANULOCYTES (test 

code = 1036)    0.1 %                                           

 

                                                    NUCLEATED RBCS (test code = 

1065)           0.0 /100WBC'S                                   

 

                                                    PLATELET COUNT (test code = 

1015)           399 K/UL                                        

 

                                                    ABSOLUTE NEUTROPHILS (test c

ode 

= 1066)         6.02 K/UL                                       

 

                                                    ABSOLUTE LYMPHOCYTES (test c

ode 

= 1067)         2.35 K/UL                                       

 

                                                    ABSOLUTE MONOCYTES (test cod

e = 

1068)           0.43 K/UL                                       

 

                                                    ABSOLUTE EOSINOPHILS (test c

ode 

= 1040)         0.12 K/UL                                       

 

                                                    ABSOLUTE BASOPHILS (test cod

e = 

1069)           0.04 K/UL                                       

 

                                                    ABS IMMATURE GRANULOCYTES (t

est 

code = 1020)    0.01 K/UL                                       

 

                                                    ABS NUCLEATED RBCS (test cod

e = 

93365)          0.00 K/UL                                       





Lamberto TovarDIRECT [id number] 09:24:44* 



                      Test Item  Value      Reference Range Interpretation Comme

nts

 

                      DIRECT ANDRÉS (test code = 64029) NEGATIVE   NEGATIVE     

         





DIRECT ANDRÉS [ADDED]2023 00:00:00* 



                      Test Item  Value      Reference Range Interpretation Comme

nts

 

                      DIRECT ANDRÉS (test code = 77862) NEGATIVE                

         





Lamberto TovarRNA POLYMERASE III IgG [id number] 21:18:13* 



                      Test Item  Value      Reference Range Interpretation Comme

nts

 

                                                    RNA POLYMERASE III IgG 

ANTIBODY (test code = 

48780)          6 Units         0-19                            INTERPRETIVE 

INFORMATION: RNA 

Polymerase III Antibody, 

IgG 19 Units or less 

......Negative 20 - 39 

Units .........Weak 

Positive 40 - 80 Units 

.........Moderate 

Positive 81 Units or 

greater ...Strong 

PositiveThe presence of 

RNA polymerase III IgG 

antibody, when 

considered in 

conjunction with other 

laboratory and clinical 

findings, is an aid in 

the diagnosis of 

systemic sclerosis (SSc) 

with increased incidence 

of skin involvement and 

renal crisis with the 

diffuse cutaneous form 

of SSc. RNA polymerase 

III IgG antibody occur 

in about 11-23 percent 

of SSc patients, and 

typically in the absence 

of anti-centromere and 

anti-Scl-70 antibodies.A 

negative result 

indicates no detectable 

IgG antibodies to the 

dominant antigen of RNA 

polymerase III and does 

not rule out the 

possibility of SSc. 

False-positive results 

may also occur due to 

non-specific binding of 

immune complexes. Strong 

clinical correlation is 

recommended.If clinical 

suspicion remains, 

consider additional 

testing for other 

antibodies associated 

with SSc, including 

centromere, Scl-70, 

U3-RNP, PM/Scl, or 

Th/To. TESTING PERFORMED 

AT [facility], 

[address] [address] 

[place] [id number] CLIA 

NO. [id number]





RNA POLYMERASE III IgG ANTIBODY [ADDED]2023 00:00:00* 



                      Test Item  Value      Reference Range Interpretation Comme

nts

 

                                                    RNA POLYMERASE III IgG ANTIB

BEATRIZ (test 

code = 21225)   6 Units                                         





Lamberto Little, [id number] 12:03:27SPECIMEN NUMBER: [id number] 
CULTURE, URINE SPECIMEN NUMBER: [id number] SPECIMEN COMMENT: URINE SOURCE: URINE 
REPORT STATUS: FINAL ISOLATE NUMBER 1: IDENTIFICATION: 2023 10-50,000 
CFU/ML STREPTOCOCCUS AGALACTIAE (GROUP B) ADDITIONAL OBSERVATIONS: PENICILLIN 
AND AMPICILLIN ARE DRUGS OF CHOICE FORTREATMENT OF B-HEMOLYTIC STREPTOCOCCAL 
INFECTIONS. SUSCEPTIBILITY TESTING OF PENICILLIN AND OTHER B-LACTAMS APPROVED BY
 THE [employer] FOR TREATMENT OF B-HEMOLYTIC STREPTOCOCCAL 
INFECTIONS NEED NOT BE PERFORMED  ROUTINELY. ADDITIONAL OBSERVATIONS: 2023
 >100,000 CFU/ML MIXED MICROBIAL POPULATION PRESENT, NO PREDOMINATING 
ORGANISMS;PROBABLE CONTAMINANTS.QUANTIFERON TB GOLD [id number] 05:12:40* 



                      Test Item  Value      Reference Range Interpretation Comme

nts

 

                                                    QUANTIFERON TB GOLD 

PLUS (test code = 

45128)          NEGATIVE        NEGATIVE                        Interpretive 

guidelines: Positive: 

Indicates active or 

latent infection by 

MTB complex. May also 

be positive with M. 

kansasii, M. szulgai 

and M. marinum. Not 

positive with BCG 

therapy. To establish 

a diagnosis of active 

disease, correlate 

with clinical and 

radiographic data. 

Indeterminate: May 

occur from excessive 

levels of gamma 

interferon, heterophil 

antibodies, anergy or 

handling issues. 

Repeat analysis is 

recommended. Negative: 

Presumptive negative 

for active or latent 

MTB infection. False 

negatives may 

occasionally be seen 

with impaired immune 

function or testing 

too early after 

exposure. 

______________________

_ ***** GAMMA 

INTERFERON RESULTS 

*****

 

                                                    TB1-NIL (test code = 

18992)          0.00 IU/ML      <0.35                           

 

                                                    TB2-NIL (test code = 

68254)          0.00 IU/ML      <0.35                           

 

                                                    MITOGEN-NIL (test 

code = 56873)   5.64 IU/ML                                      

 

                                                    NIL (test code = 

23553)          0.02 IU/ML                                      





JORGE (ANTI-NUCLEAR AB) WITH REFLEX [id number] 04:00:27* 



                      Test Item  Value      Reference Range Interpretation Comme

nts

 

                                                    ANTI-NUCLEAR 

ANTIBODIES (test 

code = 3506)    POSITIVE        NEGATIVE        A               

 

                                                    JORGE PATTERN 

(REPORTED AS 

TITER) (test code 

= 60827)        SEE BELOW                                       

 

                                                    HOMOGENEOUS (test 

code = 79895)   1:80 TITER      NEGATIVE        H               

 

                                                    SPECKLED (test 

code = 788934)  NEGATIVE TITER  NEGATIVE                        

 

                                                    DENSE FINE 

SPECKLED (test 

code = 28645)   NEGATIVE TITER  NEGATIVE                        

 

                                                    CENTROMERE (test 

code = 821336)  NEGATIVE TITER  NEGATIVE                        

 

                                                    COARSE SPECKLED 

(test code = 

104972)         NEGATIVE TITER  NEGATIVE                        

 

                                                    DISCRETE NUCLEAR 

DOTS (test code = 

842358)         NEGATIVE TITER  NEGATIVE                        

 

                                                    NUCLEOLAR (test 

code = 295829)  NEGATIVE TITER  NEGATIVE                        

 

                                                    NUCLEAR MEMBRANE 

(test code = 

856762)         NEGATIVE TITER  NEGATIVE                        

 

                                                    CYTO. RETICULAR 

(RAFI) (test code 

= 620185)       NEGATIVE        NEGATIVE                        

 

                                                    COMMENTS (test 

code = 354413)  NONE                                            

 

                                                    METHOD (test code 

= 93018)        (NOTE)                                          NOTE: EFFECTIVE 

2022, METHOD IS 

TRANSITIONED TO THE 

[place] I[employer] PLATFORM. THE 

METHOD INCLUDES A 

SCREENTHRESHOLD OF 

1:80, DIGITIZED AND 

COMPUTER 

ALGORITHM-ASSISTEDINTE

RPRETATION OF TITERS 

AND DIGITAL PATTERNS, 

AND HEp-2 CELLLINE 

SUBSTRATE. ADDITIONAL 

UNUSUAL PATTERNS WILL 

BE GIVEN ASCOMMENTS. 

FOR MORE INFORMATION, 

SEE 

[email or web address]

[email or web address]





QUANTIFERON TB GOLD PLUS [ADDED]2023 00:00:00* 



                      Test Item  Value      Reference Range Interpretation Comme

nts

 

                                                    QUANTIFERON TB GOLD PLUS (te

st 

code = 49477)   NEGATIVE                                        

 

                      TB1-NIL (test code = 08198) 0.00 IU/ML                    

   

 

                      TB2-NIL (test code = 03249) 0.00 IU/ML                    

   

 

                      MITOGEN-NIL (test code = 19613) 5.64 IU/ML                

       

 

                      NIL (test code = 12680) 0.02 IU/ML                       





Lamberto Baum (ANTI-NUCLEAR AB) WITH REFLEX TITER [ADDED]2023 
00:00:00* 



                      Test Item  Value      Reference Range Interpretation Comme

nts

 

                                                    ANTI-NUCLEAR ANTIBODIES (es

t 

code = 3506)    POSITIVE                                        

 

                                                    JORGE PATTERN (REPORTED AS 

TITER) (test code = 70944) SEE BELOW                                       

 

                                                    HOMOGENEOUS (test code = 

50262)          1:80 TITER                                      

 

                      SPECKLED (test code = 238464) NEGATIVE TITER              

         

 

                                                    DENSE FINE SPECKLED (test co

de 

= 27955)        NEGATIVE TITER                                  

 

                                                    CENTROMERE (test code = 

517729)         NEGATIVE TITER                                  

 

                                                    COARSE SPECKLED (test code =

 

481759)         NEGATIVE TITER                                  

 

                                                    DISCRETE NUCLEAR DOTS (test 

code = 296443)  NEGATIVE TITER                                  

 

                      NUCLEOLAR (test code = 895045) NEGATIVE TITER             

          

 

                                                    NUCLEAR MEMBRANE (test code 

= 

107441)         NEGATIVE TITER                                  

 

                                                    CYTO. RETICULAR (RAFI) (test

 

code = 877284)  NEGATIVE                                        

 

                      COMMENTS (test code = 029306) NONE                        

     

 

                      METHOD (test code = 81906) (NOTE)                         

  





Lamberto TovarCULTURE, URINE [ADDED]2023 00:00:00* 



                      Test Item  Value      Reference Range Interpretation Comme

nts

 

                                                    CULTURE, URINE (test 

code = 66621)                           SPECIMEN NUMBER: 

[id number]                                                   





Lamberto TovarALBUMIN/CREATININE RATIO, URINE, [id number] 08:34:04* 



                      Test Item  Value      Reference Range Interpretation Comme

nts

 

                                                    CREATININE, URINE, 

CONC. (test code = 

2072)           143.9 MG/DL     NOT ESTAB                       

 

                                                    ALBUMIN, URINE, 

RANDOM (test code = 

50362)          1.2 MG/DL       NOT ESTAB                       

 

                                                    CALC ALBUMIN/CREAT, 

RND (test code = 

57753)          8 MG/G          <30                             Note: 

Albumin/Creatinine 

ratio reference 

interval reflects ADA 

and NKF guidelines.





COMPLEMENT C3 AND [id number] 06:22:20* 



                      Test Item  Value      Reference Range Interpretation Comme

nts

 

                                                    COMPLEMENT C3 (test 

code = 3509)    133 MG/DL                                 

 

                                                    COMPLEMENT C4 (test 

code = 3510)    21 MG/DL        10-40                           *** [facility] has impo

rtant 

pathology staff 

changes effective 

2023. *** New 

pathology staff will 

provide 

uninterrupted, 

excellent patient 

care and clinical 

consultation. See 

URL: 

[email or web address]

[email or web address]. UNLESS 

OTHERWISE INDICATED, 

ALL TESTING PERFORMED 

AT [facility] 

[address] [place] [address] 

[place] CLIA: [id number], 

CAP: [id number]





C-REACTIVE [id number] 06:22:20* 



                      Test Item  Value      Reference Range Interpretation Comme

nts

 

                                                    C-REACTIVE PROTEIN (test cod

e = 

3513)           1.5 MG/DL       <0.5            H               





COMPREHENSIVE METABOLIC [id number] 05:54:29* 



                      Test Item  Value      Reference Range Interpretation Comme

nts

 

                                                    GLUCOSE (test code = 

2217)           76 MG/DL        70-99                           

 

                                                    BUN (test code = 

)           18 MG/DL        6-20                            

 

                                                    CREATININE (test 

code = 2214)    0.66 MG/DL      0.60-1.30                       

 

                                                    eGFR ( CKD-EPI) 

(test code = 62149)                     122 

ML/MIN/1.73         >60                                     

 

                                                    CALC BUN/CREAT (test 

code = 2235)    27 RATIO        6-28                            

 

                                                    SODIUM (test code = 

2231)           140 MEQ/L       133-146                         

 

                                                    POTASSIUM (test code 

= 2228)         4.2 MEQ/L       3.5-5.4                         

 

                                                    CHLORIDE (test code 

= 2215)         104 MEQ/L                                 

 

                                                    CARBON DIOXIDE (test 

code = 2206)    22 MEQ/L        19-31                           

 

                                                    CALCIUM (test code = 

2209)           9.7 MG/DL       8.5-10.5                        

 

                                                    PROTEIN, TOTAL (test 

code = 2229)    8.0 G/DL        6.1-8.3                         

 

                                                    ALBUMIN (test code = 

2201)           4.3 G/DL        3.5-5.2                         

 

                                                    CALC GLOBULIN (test 

code = 2240)    3.7 G/DL        1.9-3.7                         

 

                                                    CALC A/G RATIO (test 

code = 2234)    1.2 RATIO       1.0-2.6                         

 

                                                    BILIRUBIN, TOTAL 

(test code = 2207) 0.3 MG/DL       See_Comment                     [Automated me

ssage] 

The system which 

generated this 

result transmitted 

reference range: 

<=1.2. The reference 

range was not used 

to interpret this 

result as 

normal/abnormal.

 

                                                    ALKALINE PHOSPHATASE 

(test code = 2204) 77 U/L                                    

 

                                                    AST (test code = 

2218)           16 U/L          9-40                            

 

                                                    ALT (test code = 

2219)           11 U/L          5-40                            





SCL-70 [id number] 05:49:07* 



                      Test Item  Value      Reference Range Interpretation Comme

nts

 

                      SCL-70 ANTIBODY (test code = 4606) <0.2 AI    <1.0        

          





SMITH AND RNP [id number] 05:49:07* 



                      Test Item  Value      Reference Range Interpretation Comme

nts

 

                                                    SMITH (Sm) ANTIBODY (test co

de = 

61272)          <0.2 AI         <1.0                            

 

                      RNP ANTIBODY (test code = 72686) <0.2 AI    <1.0          

        





SJOGREN'S SS-A AND SS-B [id number] 05:49:07* 



                      Test Item  Value      Reference Range Interpretation Comme

nts

 

                                                    SJOGREN'S SS-A ANTIBODY (es

t code = 

07841)          <0.2 AI         <1.0                            

 

                                                    SJOGREN'S SS-B ANTIBODY (es

t code = 

05060)          <0.2 AI         <1.0                            





CHROMATIN [id number] 05:49:07* 



                      Test Item  Value      Reference Range Interpretation Comme

nts

 

                                                    CHROMATIN ANTIBODY (test cod

e = 

74176)          <0.2 AI         <1.0                            





DNA DS ANTIBODY REFLEX [id number] 05:49:07* 



                      Test Item  Value      Reference Range Interpretation Comme

nts

 

                                                    dsDNA ANTIBODY (test 

code = 25307)   <1.0            SEE BELOW                       NEGATIVE . . . .

 . . . 

. . . . . . . IU/ML 

<=4.9 INDETERMINATE. . 

. . . . . . . . . . 

IU/ML 5.0-9.0 POSITIVE 

. . . . . . . . . . . . 

. . IU/ML >=10.0

 

                                                    dsDNA ANTIBODY REFLEX 

(test code = 27605) (NOTE)          NEGATIVE                        NOTE: REFLEX

 CRITERIA 

NOT MET FOR DNA DOUBLE 

STRANDED ANTIBODY BY 

MANPREET METHOD.





SEDIMENTATION [id number] 04:46:10* 



                      Test Item  Value      Reference Range Interpretation Comme

nts

 

                                                    SEDIMENTATION RATE (test cod

e = 

1017)           19 MM/HOUR      0-20                            





CBC W/AUTO DIFF WITH [id number] 04:08:39* 



                      Test Item  Value      Reference Range Interpretation Comme

nts

 

                                                    WBC (test code = 

1001)           8.9 K/UL        3.5-11.0                        

 

                                                    RBC (test code = 

1002)           4.83 M/UL       3.80-5.40                       

 

                                                    HEMOGLOBIN (test code 

= 1003)         11.2 G/DL       11.5-15.5       L               

 

                                                    HEMATOCRIT (test code 

= 1004)         36.1 %          34.0-45.0                       

 

                                                    MCV (test code = 

1005)           74.7 fL         80.0-99.0       L               

 

                                                    MCH (test code = 

1006)           23.2 PG         25.0-33.0       L               

 

                                                    MCHC (test code = 

1007)           31.0 G/DL       31.0-36.0                       

 

                                                    RDW (test code = 

1038)           16.0 %          11.5-15.0       H               

 

                                                    NEUTROPHILS (test 

code = 1008)    67.7 %                                          

 

                                                    LYMPHOCYTES (test 

code = 1010)    21.6 %                                          

 

                                                    MONOCYTES (test code 

= 1011)         6.3 %                                           

 

                                                    EOSINOPHILS (test 

code = 1012)    3.5 %                                           

 

                                                    BASOPHILS (test code 

= 1013)         0.6 %                                           

 

                                                    IMMATURE GRANULOCYTES 

(test code = 1036) 0.3 %                                           

 

                                                    NUCLEATED RBCS (test 

code = 1065)    0.0 /100 WBC'S  See_Comment                     [Automated messa

ge] 

The system which 

generated this 

result transmitted 

reference range: 

0.0. The reference 

range was not used 

to interpret this 

result as 

normal/abnormal.

 

                                                    PLATELET COUNT (test 

code = 1015)    381 K/UL        130-400                         

 

                                                    ABSOLUTE NEUTROPHILS 

(test code = 1066) 6.06 K/UL       1.50-7.50                       

 

                                                    ABSOLUTE LYMPHOCYTES 

(test code = 1067) 1.93 K/UL       1.00-4.00                       

 

                                                    ABSOLUTE MONOCYTES 

(test code = 1068) 0.56 K/UL       0.20-1.00                       

 

                                                    ABSOLUTE EOSINOPHILS 

(test code = 1040) 0.31 K/UL       0.00-0.50                       

 

                                                    ABSOLUTE BASOPHILS 

(test code = 1069) 0.05 K/UL       0.00-0.20                       

 

                                                    ABS IMMATURE 

GRANULOCYTES (test 

code = 1020)    0.03 K/UL       0.00-0.10                       

 

                                                    ABS NUCLEATED RBCS 

(test code = 31419) 0.00 K/UL       0.00-0.11                       





DEL TORO AND RNP ANTIBODIES [ADDED]2023 00:00:00* 



                      Test Item  Value      Reference Range Interpretation Comme

nts

 

                                                    SMITH (Sm) ANTIBODY (test co

de = 

23752)          <0.2 AI                                         

 

                      RNP ANTIBODY (test code = 55556) <0.2 AI                  

        





Lamberto F AustinSJOGREN'S SS-A AND SS-B ANTIBODIES [ADDED]2023 00:00:00* 



                      Test Item  Value      Reference Range Interpretation Comme

nts

 

                                                    SJOGREN'S SS-A ANTIBODY (es

t code = 

63374)          <0.2 AI                                         

 

                                                    SJOGREN'S SS-B ANTIBODY (es

t code = 

06541)          <0.2 AI                                         





Lamberto ENAMORADO AustinCOMPREHENSIVE METABOLIC PANEL [ADDED]2023 00:00:00* 



                      Test Item  Value      Reference Range Interpretation Comme

nts

 

                      GLUCOSE (test code = 2217) 76 MG/DL                       

  

 

                      BUN (test code = 2208) 18 MG/DL                         

 

                      CREATININE (test code = 2214) 0.66 MG/DL                  

     

 

                                                    eGFR ( CKD-EPI) (test 

code = 65923)   122 ML/MIN/1.73                                 

 

                                                    CALC BUN/CREAT (test code = 

2235)           27 RATIO                                        

 

                      SODIUM (test code = 2231) 140 MEQ/L                       

 

 

                      POTASSIUM (test code = 2228) 4.2 MEQ/L                    

    

 

                      CHLORIDE (test code = 2215) 104 MEQ/L                     

   

 

                                                    CARBON DIOXIDE (test code = 

2206)           22 MEQ/L                                        

 

                      CALCIUM (test code = 2209) 9.7 MG/DL                      

  

 

                                                    PROTEIN, TOTAL (test code = 

2229)           8.0 G/DL                                        

 

                      ALBUMIN (test code = 2201) 4.3 G/DL                       

  

 

                                                    CALC GLOBULIN (test code = 

2240)           3.7 G/DL                                        

 

                                                    CALC A/G RATIO (test code = 

2234)           1.2 RATIO                                       

 

                                                    BILIRUBIN, TOTAL (test code 

= 

2207)           0.3 MG/DL                                       

 

                                                    ALKALINE PHOSPHATASE (test 

code = 2204)    77 U/L                                          

 

                      AST (test code = 2218) 16 U/L                           

 

                      ALT (test code = 2219) 11 U/L                           





Lamberto ENAMORADO AustinCHROMATIN ANTIBODY [ADDED]2023 00:00:00* 



                      Test Item  Value      Reference Range Interpretation Comme

nts

 

                                                    CHROMATIN ANTIBODY (test cod

e = 

81255)          <0.2 AI                                         





Lamberto ENAMORADO AustinSEDIMENTATION RATE [ADDED]2023 00:00:00* 



                      Test Item  Value      Reference Range Interpretation Comme

nts

 

                                                    SEDIMENTATION RATE (test cod

e = 

1017)           19 MM/HOUR                                      





Lamberto TovarDNA DS ANTIBODY REFLEX MANPREET [ADDED]2023 00:00:00* 



                      Test Item  Value      Reference Range Interpretation Comme

nts

 

                      dsDNA ANTIBODY (test code = 11748) <1.0                   

          

 

                                                    dsDNA ANTIBODY REFLEX (test 

code = 

10995)          (NOTE)                                          





Lamberto ENAMORADO GuyC-REACTIVE PROTEIN [ADDED]2023 00:00:00* 



                      Test Item  Value      Reference Range Interpretation Comme

nts

 

                                                    C-REACTIVE PROTEIN (test cod

e = 

3513)           1.5 MG/DL                                       





Lamberto ENAMORADO GuyCBC W/AUTO DIFF WITH PLATELETS [ADDED]2023 00:00:00* 



                      Test Item  Value      Reference Range Interpretation Comme

nts

 

                      WBC (test code = 1001) 8.9 K/UL                         

 

                      RBC (test code = 1002) 4.83 M/UL                        

 

                      HEMOGLOBIN (test code = 1003) 11.2 G/DL                   

     

 

                      HEMATOCRIT (test code = 1004) 36.1 %                      

     

 

                      MCV (test code = 1005) 74.7 fL                          

 

                      MCH (test code = 1006) 23.2 PG                          

 

                      MCHC (test code = 1007) 31.0 G/DL                        

 

                      RDW (test code = 1038) 16.0 %                           

 

                      NEUTROPHILS (test code = 1008) 67.7 %                     

      

 

                      LYMPHOCYTES (test code = 1010) 21.6 %                     

      

 

                      MONOCYTES (test code = 1011) 6.3 %                        

    

 

                      EOSINOPHILS (test code = 1012) 3.5 %                      

      

 

                      BASOPHILS (test code = 1013) 0.6 %                        

    

 

                                                    IMMATURE GRANULOCYTES (test 

code = 1036)    0.3 %                                           

 

                                                    NUCLEATED RBCS (test code = 

1065)           0.0 /100WBC'S                                   

 

                                                    PLATELET COUNT (test code = 

1015)           381 K/UL                                        

 

                                                    ABSOLUTE NEUTROPHILS (test c

ode 

= 1066)         6.06 K/UL                                       

 

                                                    ABSOLUTE LYMPHOCYTES (test c

ode 

= 1067)         1.93 K/UL                                       

 

                                                    ABSOLUTE MONOCYTES (test cod

e = 

1068)           0.56 K/UL                                       

 

                                                    ABSOLUTE EOSINOPHILS (test c

ode 

= 1040)         0.31 K/UL                                       

 

                                                    ABSOLUTE BASOPHILS (test cod

e = 

1069)           0.05 K/UL                                       

 

                                                    ABS IMMATURE GRANULOCYTES (t

est 

code = 1020)    0.03 K/UL                                       

 

                                                    ABS NUCLEATED RBCS (test cod

e = 

42924)          0.00 K/UL                                       





Lamberto ENAMORADO GuyCOMPLEMENT C3 AND C4 [ADDED]2023 00:00:00* 



                      Test Item  Value      Reference Range Interpretation Comme

nts

 

                      COMPLEMENT C3 (test code = 3509) 133 MG/DL                

        

 

                      COMPLEMENT C4 (test code = 3510) 21 MG/DL                 

        





Lamberto ENAMORADO GuyALBUMIN/CREATININE RATIO, URINE, RANDOM [ADDED]2023 
00:00:00* 



                      Test Item  Value      Reference Range Interpretation Comme

nts

 

                                                    CREATININE, URINE, CONC. (te

st 

code = 2072)    143.9 MG/DL                                     

 

                                                    ALBUMIN, URINE, RANDOM (test

 code 

= 54654)        1.2 MG/DL                                       

 

                                                    CALC ALBUMIN/CREAT, RND (es

t 

code = 29707)   8 MG/G                                          





Lamberto TovarSCL-70 ANTIBODY [ADDED]2023 00:00:00* 



                      Test Item  Value      Reference Range Interpretation Comme

nts

 

                      SCL-70 ANTIBODY (test code = 4606) <0.2 AI                

          





Lamberto Baum (ANTI-NUCLEAR AB) WITH REFLEX [id number] 00:00:00* 



                      Test Item  Value      Reference Range Interpretation Comme

nts

 

                                                    ANTI-NUCLEAR ANTIBODIES (es

t 

code = 3506)    POSITIVE                                        

 

                                                    JORGE PATTERN (REPORTED AS 

TITER) (test code = 12478) SEE BELOW                                       

 

                                                    HOMOGENEOUS (test code = 

84931)          1:160 TITER                                     

 

                      SPECKLED (test code = 916240) NEGATIVE TITER              

         

 

                                                    DENSE FINE SPECKLED (test co

de 

= 79947)        NEGATIVE TITER                                  

 

                                                    CENTROMERE (test code = 

237682)         NEGATIVE TITER                                  

 

                                                    COARSE SPECKLED (test code =

 

460999)         NEGATIVE TITER                                  

 

                                                    DISCRETE NUCLEAR DOTS (test 

code = 851503)  NEGATIVE TITER                                  

 

                      NUCLEOLAR (test code = 360722) NEGATIVE TITER             

          

 

                                                    NUCLEAR MEMBRANE (test code 

= 

406661)         NEGATIVE TITER                                  

 

                                                    CYTO. RETICULAR (RAFI) (test

 

code = 364480)  NEGATIVE                                        

 

                      COMMENTS (test code = 976265) NONE                        

     

 

                      METHOD (test code = 28329) (NOTE)                         

  





Lamberto TovarTSH + FREE T4 [id number] 00:00:00* 



                      Test Item  Value      Reference Range Interpretation Comme

nts

 

                                                    TSH, THIRD GENERATION (test 

code 

= 2821)         7.230 UIU/ML                                    

 

                                                    FREE T4 (THYROXINE) (test co

de = 

2823)           1.01 NG/DL                                      





Lamberto TovarVITAMIN B 12 AND FOLIC [id number] 00:00:00* 



                      Test Item  Value      Reference Range Interpretation Comme

nts

 

                      VITAMIN B-12 (test code = 2840) 856 PG/ML                 

       

 

                      FOLIC ACID (test code = 2695) 5.0 UG/L                    

     





Lamberto TovarRHEUMATOID FACTOR, [id number] 00:00:00* 



                      Test Item  Value      Reference Range Interpretation Comme

wilian

 

                                                    RHEUMATOID FACTOR, QUANT (te

st code 

= 3502)         23 IU/ML                                        





Lamberto TovarSEDIMENTATION [id number] 00:00:00* 



                      Test Item  Value      Reference Range Interpretation Comme

nts

 

                                                    SEDIMENTATION RATE (test cod

e = 

1017)           16 MM/HOUR                                      





Lamberto TovarURIC [id number] 00:00:00* 



                      Test Item  Value      Reference Range Interpretation Comme

nts

 

                      URIC ACID (test code = 2233) 4.0 MG/DL                    

    





Lamberto TovarC-REACTIVE [id number] 00:00:00* 



                      Test Item  Value      Reference Range Interpretation Comme

nts

 

                                                    C-REACTIVE PROTEIN (test cod

e = 

3513)           0.7 MG/DL                                       





Lamberto TovarVITAMIN D, 25 [id number] 00:00:00* 



                      Test Item  Value      Reference Range Interpretation Comme

nts

 

                      VITAMIN D, 25 OH (test code = 4958) 12 NG/ML              

           





Lamberto TovarCBC W/AUTO [id number] 00:00:00* 



                      Test Item  Value      Reference Range Interpretation Comme

nts

 

                      WBC (test code = 1001) 7.0 K/UL                         

 

                      RBC (test code = 1002) 4.65 M/UL                        

 

                      HEMOGLOBIN (test code = 1003) 10.8 G/DL                   

     

 

                      HEMATOCRIT (test code = 1004) 35.3 %                      

     

 

                      MCV (test code = 1005) 75.9 fL                          

 

                      MCH (test code = 1006) 23.2 PG                          

 

                      MCHC (test code = 1007) 30.6 G/DL                        

 

                      RDW (test code = 1038) 18.0 %                           

 

                      NEUTROPHILS (test code = 1008) 65.6 %                     

      

 

                      LYMPHOCYTES (test code = 1010) 25.8 %                     

      

 

                      MONOCYTES (test code = 1011) 5.6 %                        

    

 

                      EOSINOPHILS (test code = 1012) 2.3 %                      

      

 

                      BASOPHILS (test code = 1013) 0.6 %                        

    

 

                                                    IMMATURE GRANULOCYTES (test 

code = 1036)    0.1 %                                           

 

                                                    NUCLEATED RBCS (test code = 

1065)           0.0 /100WBC'S                                   

 

                                                    PLATELET COUNT (test code = 

1015)           351 K/UL                                        

 

                                                    ABSOLUTE NEUTROPHILS (test c

ode 

= 1066)         4.57 K/UL                                       

 

                                                    ABSOLUTE LYMPHOCYTES (test c

ode 

= 1067)         1.80 K/UL                                       

 

                                                    ABSOLUTE MONOCYTES (test cod

e = 

1068)           0.39 K/UL                                       

 

                                                    ABSOLUTE EOSINOPHILS (test c

ode 

= 1040)         0.16 K/UL                                       

 

                                                    ABSOLUTE BASOPHILS (test cod

e = 

1069)           0.04 K/UL                                       

 

                                                    ABS IMMATURE GRANULOCYTES (t

est 

code = 1020)    0.01 K/UL                                       

 

                                                    ABS NUCLEATED RBCS (test cod

e = 

71277)          0.00 K/UL                                       





Lamberto TovarHEMOGLOBIN [id number] 00:00:00* 



                      Test Item  Value      Reference Range Interpretation Comme

nts

 

                      HEMOGLOBIN A1c (test code = 61413) 5.7 %                  

          





Lamberto ENAMORADO GuyLIPID [id number] 00:00:00* 



                      Test Item  Value      Reference Range Interpretation Comme

nts

 

                      CHOLESTEROL (test code = 2210) 114 MG/DL                  

      

 

                      TRIGLYCERIDES (test code = 2232) 53 MG/DL                 

        

 

                      HDL CHOLESTEROL (test code = 2220) 48 MG/DL               

          

 

                      CALC LDL CHOL (test code = 2237) 53 MG/DL                 

        

 

                                                    RISK RATIO LDL/HDL (test cod

e = 

2238)           1.10 RATIO                                      





Lamberto TovarCOMPREHENSIVE METABOLIC [id number] 00:00:00* 



                      Test Item  Value      Reference Range Interpretation Comme

nts

 

                      GLUCOSE (test code = 2217) 78 MG/DL                       

  

 

                      BUN (test code = 2208) 16 MG/DL                         

 

                      CREATININE (test code = 2214) 0.67 MG/DL                  

     

 

                                                    eGFR ( CKD-EPI) (test 

code = 08648)   122 ML/MIN/1.73                                 

 

                                                    CALC BUN/CREAT (test code = 

2235)           24 RATIO                                        

 

                      SODIUM (test code = 2231) 138 MEQ/L                       

 

 

                      POTASSIUM (test code = 2228) 4.6 MEQ/L                    

    

 

                      CHLORIDE (test code = 2215) 104 MEQ/L                     

   

 

                                                    CARBON DIOXIDE (test code = 

2206)           24 MEQ/L                                        

 

                      CALCIUM (test code = 2209) 9.3 MG/DL                      

  

 

                                                    PROTEIN, TOTAL (test code = 

2229)           7.5 G/DL                                        

 

                      ALBUMIN (test code = 2201) 4.2 G/DL                       

  

 

                                                    CALC GLOBULIN (test code = 

2240)           3.3 G/DL                                        

 

                                                    CALC A/G RATIO (test code = 

2234)           1.3 RATIO                                       

 

                                                    BILIRUBIN, TOTAL (test code 

= 

2207)           0.4 MG/DL                                       

 

                                                    ALKALINE PHOSPHATASE (test 

code = 2204)    75 U/L                                          

 

                      AST (test code = 2218) 19 U/L                           

 

                      ALT (test code = 2219) 15 U/L                           





Lamberto Tovar



Notes





                          Date/Time    Note         Provider     Source







                                        

 

                                        

 

                                        

 

                                        

 

                                        

 

                                        

 

                                        

 

                                        

 

                                        

 

                                        

 

                                        

 

                                        

 

                                        

 

                                        

 

                                        

 

                                        

 

                                        

 

                                        

 

                                        

 

                                        

 

                                        

 

                                        

 

                                        

 

                                        

 

                                        

 

                                        

 

                                        

 

                                        

 

                                        

 

                                        

 

                                        

 

                                        

 

                                        

 

                                        



Lamberto Tovar Formerly [facility], [facility]

## 2024-09-10 NOTE — EKG
Test Date:    2024-09-09               Test Time:    21:12:40

Technician:   CHELSEA                                     

                                                     

MEASUREMENT RESULTS:                                       

Intervals:                                           

Rate:         68                                     

AK:           200                                    

QRSD:         98                                     

QT:           424                                    

QTc:          450                                    

Axis:                                                

P:            33                                     

AK:           200                                    

QRS:          73                                     

T:            37                                     

                                                     

INTERPRETIVE STATEMENTS:                                       

                                                     

Normal sinus rhythm

Normal ECG

No previous ECG available for comparison



Electronically Signed On 09-10-24 16:52:35 CDT by Anibal Lyle